# Patient Record
Sex: MALE | Race: WHITE | NOT HISPANIC OR LATINO | Employment: OTHER | URBAN - METROPOLITAN AREA
[De-identification: names, ages, dates, MRNs, and addresses within clinical notes are randomized per-mention and may not be internally consistent; named-entity substitution may affect disease eponyms.]

---

## 2020-03-24 PROBLEM — K21.9 GERD WITH STRICTURE: Status: ACTIVE | Noted: 2020-03-24

## 2020-03-24 PROBLEM — K22.2 GERD WITH STRICTURE: Status: ACTIVE | Noted: 2020-03-24

## 2020-04-13 ENCOUNTER — TELEPHONE (OUTPATIENT)
Dept: SURGERY | Facility: CLINIC | Age: 60
End: 2020-04-13

## 2020-04-13 DIAGNOSIS — C15.9 MALIGNANT NEOPLASM OF ESOPHAGUS, UNSPECIFIED LOCATION: Primary | ICD-10-CM

## 2020-04-14 ENCOUNTER — TELEPHONE (OUTPATIENT)
Dept: HEMATOLOGY/ONCOLOGY | Facility: CLINIC | Age: 60
End: 2020-04-14

## 2020-04-14 NOTE — TELEPHONE ENCOUNTER
Called to schedule appointment.  Patient does not have [employer] [facility], but wife will sign patient up with felicita.  I will call them back tomorrow to verify that he is signed up and to schedule appt.  Wife verbalized understanding.

## 2020-04-17 ENCOUNTER — TELEPHONE (OUTPATIENT)
Dept: INFUSION THERAPY | Facility: HOSPITAL | Age: 60
End: 2020-04-17

## 2020-04-17 PROBLEM — R16.0 HEPATOMEGALY: Status: ACTIVE | Noted: 2020-04-17

## 2020-04-21 PROBLEM — C15.5 MALIGNANT NEOPLASM OF LOWER THIRD OF ESOPHAGUS: Status: ACTIVE | Noted: 2020-04-21

## 2020-04-22 ENCOUNTER — TELEPHONE (OUTPATIENT)
Dept: HEMATOLOGY/ONCOLOGY | Facility: CLINIC | Age: 60
End: 2020-04-22

## 2020-04-22 NOTE — TELEPHONE ENCOUNTER
----- Message from Reza JAMES Shannon sent at 4/22/2020 10:19 AM CDT -----  Contact: Wife/Mckayla  Type:  Test Results    Who Called:  Wife/Mckayla  Name of Test (Lab/Mammo/Etc):  Liver Biopsy  Date of Test:  4/17/2020  Ordering Provider:  Jyoti (Mckayla stated that Dr. Montes's office told her since Dr. Montes was not available this week to call Dr. Valerio & she could give patient results  Where the test was performed:  [facility] Call Back Number:  [phone]  Additional Information:  n/a

## 2020-04-24 ENCOUNTER — TELEPHONE (OUTPATIENT)
Dept: INFUSION THERAPY | Facility: HOSPITAL | Age: 60
End: 2020-04-24

## 2020-04-27 ENCOUNTER — OFFICE VISIT (OUTPATIENT)
Dept: HEMATOLOGY/ONCOLOGY | Facility: CLINIC | Age: 60
End: 2020-04-27
Payer: MEDICAID

## 2020-04-27 VITALS
HEART RATE: 56 BPM | WEIGHT: 173.06 LBS | DIASTOLIC BLOOD PRESSURE: 73 MMHG | OXYGEN SATURATION: 98 % | SYSTOLIC BLOOD PRESSURE: 126 MMHG | BODY MASS INDEX: 32.7 KG/M2 | RESPIRATION RATE: 20 BRPM | TEMPERATURE: 99 F

## 2020-04-27 DIAGNOSIS — R13.10 DYSPHAGIA, UNSPECIFIED TYPE: ICD-10-CM

## 2020-04-27 DIAGNOSIS — C78.7 METASTASIS TO LIVER: ICD-10-CM

## 2020-04-27 DIAGNOSIS — C77.9 MALIGNANT NEOPLASM METASTATIC TO LYMPH NODES, UNSPECIFIED LYMPH NODE REGION: ICD-10-CM

## 2020-04-27 DIAGNOSIS — R63.4 WEIGHT LOSS, ABNORMAL: ICD-10-CM

## 2020-04-27 DIAGNOSIS — C15.9 MALIGNANT NEOPLASM OF ESOPHAGUS, UNSPECIFIED LOCATION: Primary | ICD-10-CM

## 2020-04-27 PROCEDURE — 99205 PR OFFICE/OUTPT VISIT, NEW, LEVL V, 60-74 MIN: ICD-10-PCS | Mod: S$PBB,,, | Performed by: INTERNAL MEDICINE

## 2020-04-27 PROCEDURE — 99999 PR PBB SHADOW E&M-EST. PATIENT-LVL III: ICD-10-PCS | Mod: PBBFAC,,, | Performed by: INTERNAL MEDICINE

## 2020-04-27 PROCEDURE — 99205 OFFICE O/P NEW HI 60 MIN: CPT | Mod: S$PBB,,, | Performed by: INTERNAL MEDICINE

## 2020-04-27 PROCEDURE — 99213 OFFICE O/P EST LOW 20 MIN: CPT | Mod: PBBFAC,PN | Performed by: INTERNAL MEDICINE

## 2020-04-27 PROCEDURE — 99999 PR PBB SHADOW E&M-EST. PATIENT-LVL III: CPT | Mod: PBBFAC,,, | Performed by: INTERNAL MEDICINE

## 2020-04-27 NOTE — Clinical Note
This is a new patient needing palliative chemo esophageal cancer awaiting financial assistance. I have placed the plan, let me know up dates on his financial situation. He will have palliative radiation first and would start chemo in 4-5 weeks, need mediport first. thanks

## 2020-04-27 NOTE — PROGRESS NOTES
"  INITIAL CONSULTATION     DATE: 04/27/2020  Patient Name: Sterling Toro  Referred by: Dr. Whaley  Reason for referral: esophageal cancer      Subjective:       Patient ID: Sterling Toro is a 60 y.o. male.    HPI    Oncology history for review prior to visit:     Malignant neoplasm of lower third of esophagus    4/21/2020 Initial Diagnosis     Malignant neoplasm of lower third of esophagus    12/2019 - reports start of dysphagia  3/24/2020 - PCP visit [facility] dysphagia, refer to GI  03/27/2020-GI visit  04/03/2020-Dr. Whaley - EGD-fungating mass distal esophagus, partial obstructing, easily bleeding, circumferential.  Mass located between 35-38.5 cm from incisors, location of GE junction.   Pathology:  Esophageal mass biopsy:  Invasive moderately differentiated adenocarcinoma arising in the background of Burris's esophagus.  HER2 fish is positive.  PDL1 is positive, score 50.  IHC testing for MMR proteins are normal, intact nuclear expression.  04/08/2020 orders placed for radiation oncology and surgery referrals, EUS.  4/10/20 - CT scans - a/p shows new right liver lobe lesion.  Also noted 3.9 x 4 x 3.7 cm region of masslike thickening in distal esophagus.  Scattered mild prominent mediastinal and hilar lymph nodes, largest 1.6 x 1.5 cm.  Also 2.1 x 2.2 cm necrotic gastrohepatic lymph node and 12 mm periportal lymph node.  EUS postponed, liver biopsy ordered  4/17/20 - liver core needle biopsy:  Pathology:  Carcinoma present, hepatocellular versus metastatic adenocarcinoma, favor the latter.  04/27/2020-medical oncology consult         Patient confirms history above and reports:     He reports aspiration symptoms that started approximately summer 2019. Started PPI. This helped then worsened again. Then dysphagia progressive problem.   Weight loss - 30lbs over last four months.   Appetite is good, he feels he "just cant swallow".   He has vomited from eating too fast per his report. He reports he'll cough it up " and then go try to eat again.    He reports some pain mild abdominal. Doesn't need any meds for this.     He denies past medical surgical history. Kidney stone in past and that is all he recalls. This has happened twice, first in 2000, he has never needed lithotripsy or procedure. Gall bladder stone identified. He reports possibly mild ab pain.    He reports bowels wax and wane, constipation occasional.     No chronic pain.   He had colonoscopy done 2019 that was negative.     Family history grandfather prostate ca. No GI malig.      Construction - very active. Has been working last two weeks and thinks maybe more weight loss only because becoming active again. Heavy lifting, 9-10 hours. In the last year he has noticed less stamina, moves slight slower. Works in [employer] so drives 3 hours per day total in addition to 9 hr workday.     Social:  20 pack year history cigarettes and quit approximately in 1998.  3-4 alcoholic beverages per week.  One beer per night but stopped last 3 months. He is  with 2 children and 3 step children, he  his current wife in 2006.  She developed breast cancer and underwent treatment the year after their marriage.  He has grandchildren as well.      Past Medical History:   Diagnosis Date    Kidney stone      No past surgical history on file.  Social History     Socioeconomic History    Marital status:      Spouse name: Not on file    Number of children: Not on file    Years of education: Not on file    Highest education level: Not on file   Occupational History    Not on file   Social Needs    Financial resource strain: Not on file    Food insecurity:     Worry: Not on file     Inability: Not on file    Transportation needs:     Medical: Not on file     Non-medical: Not on file   Tobacco Use    Smoking status: Former Smoker    Smokeless tobacco: Never Used   Substance and Sexual Activity    Alcohol use: Yes     Comment: occasional    Drug use: Not on file     Sexual activity: Not on file   Lifestyle    Physical activity:     Days per week: Not on file     Minutes per session: Not on file    Stress: Not on file   Relationships    Social connections:     Talks on phone: Not on file     Gets together: Not on file     Attends Anabaptist service: Not on file     Active member of club or organization: Not on file     Attends meetings of clubs or organizations: Not on file     Relationship status: Not on file   Other Topics Concern    Not on file   Social History Narrative    Not on file     Family History   Problem Relation Age of Onset    Hypertension Mother     Emphysema Father        Current Outpatient Medications   Medication Sig Dispense Refill    pantoprazole (PROTONIX) 40 MG tablet Take 1 tablet (40 mg total) by mouth once daily. 30 tablet 6     No current facility-administered medications for this visit.      Facility-Administered Medications Ordered in Other Visits   Medication Dose Route Frequency Provider Last Rate Last Dose    barium sulfate (READI-CAT) suspension 900 mL  900 mL Oral Once Benoit Whaley MD         ALLERGIES REVIEWED    Review of Systems    Constitutional:  Positive for activity change, appetite change, positive fatigue, fever and unexpected weight change.   HENT: Negative for mouth sores and sore throat.    Respiratory: Negative for cough and shortness of breath.    Cardiovascular: Negative for chest pain, palpitations and leg swelling.   Gastrointestinal: Negative for abdominal pain, constipation and diarrhea.   Genitourinary: Negative for dysuria and frequency.   Musculoskeletal: Negative for back pain and joint swelling.   Neurological: Negative for weakness, light-headedness and numbness.   Hematological: Does not bruise/bleed easily.   Skin: no rash, no lesions, no itching    Objective:      Weight loss noted from 2019 to current  /73 (BP Location: Right arm, Patient Position: Sitting, BP Method: Medium (Automatic))    Pulse (!) 56   Temp 98.5 °F (36.9 °C) (Oral)   Resp 20   Wt 78.5 kg (173 lb 1 oz)   SpO2 98%   BMI 32.70 kg/m²     Wt Readings from Last 25 Encounters:   04/27/20 78.5 kg (173 lb 1 oz)   04/17/20 77.1 kg (170 lb)   09/13/19 95.2 kg (209 lb 14.4 oz)   01/10/19 97.9 kg (215 lb 12.8 oz)   09/29/18 94.1 kg (207 lb 7.3 oz)   01/26/18 93.2 kg (205 lb 6.4 oz)       Physical Exam    Constitutional: He is oriented to person, place, and time. No distress.  he is not on healthy-appearing  HENT: Mouth/Throat: Oropharynx is clear and moist. No oropharyngeal exudate.   Eyes: Conjunctivae and EOM are normal.   Neck: Normal range of motion. Neck supple.   Cardiovascular: Normal rate, regular rhythm, normal heart sounds and intact distal pulses.    Pulmonary/Chest: Effort normal and breath sounds normal. he has no wheezes.   He has no rales.    Abdominal: Soft. Bowel sounds are normal. he exhibits no distension. There is no tenderness.   Musculoskeletal: he exhibits no edema or tenderness.   Lymphadenopathy:     he has no cervical adenopathy. no supraclavicular adenopathy. No axillary LAD.   Neurological: he is alert and oriented to person, place, and time. he has normal strength. No cranial nerve deficit or sensory deficit.   Skin: Skin is warm and dry. No rash noted. No erythema.   Psychiatric: he has a normal mood and affect. speech is normal.   Nursing note and vitals reviewed.        Assessment:       1. Malignant neoplasm of esophagus, unspecified location    2. 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 Metastasis to liver    4. Weight loss, abnormal    5. Dysphagia, unspecified type      Cancer Staging  Malignant neoplasm of lower third of esophagus  Staging form: Esophagus - Adenocarcinoma, AJCC 8th Edition  - Clinical stage from 4/27/2020: Stage IVB (cNX, pM1) - Unsigned      ECOG SCORE         ECOG 1-2    Patient is a  60 y.o. male here with new diagnosis adenocarcinoma of the  distal esophageal cancer with biopsy-proven single liver metastasis.  HER2 positive PDL1 positive, which is good news in regards to additional treatment targets.    Discussed briefly with Dr. Lino Zaman, confirm there is no indication to consider curative intent plan.  Discussed with Dr. Eldridge. Radiation Oncology as well and possible palliative radiation prior to systemic treatment if systemic treatment is done.      For systemic therapy, first-line standard treatment for HER2 positive adenocarcinoma of the esophagus or GE junction would be chemotherapy doublet plus trastuzumab, such as FOLFOX trastuzumab.  (Expect better tolerance than alternative regimen cisplatin 5 FU with trastuzumab).  Expected high toxicity with triplet regimen and would not recommend this.   Also discussed later line immunotherapy since he is PDL1 positive so this is good news.    Discussed incurable but very treatable to improve quality of life and prolong life, he and wife expressed good understanding of this    Discussed diagnosis, work-up, staging and treatment recommendations in great detail with patient and wife.  Emotional support given.    He would like to proceed with palliative radiation followed by systemic chemotherapy, and does plan to obtain a 2nd opinion which is encouraged.  He does not currently have insurance coverage for chemotherapy and plans to pursue this, he will meet with financial aid.     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    MD visit here 3 weeks to re-evaluate  and review plan for chemotherapy going forward  If he is able to obtain financial assistance for chemotherapy will try to start this process sooner.    Exercise/nutrition  Important to keep follow-up with PCP and radiation oncology.    Discussed plan above in great detail with patient and wife and all questions answered to their satisfaction. Proceed with plan above.       Thank you for the referral. Please call with any questions.           Rama Valerio M.D.  Hematology Oncology  [facility]  [place]

## 2020-04-28 DIAGNOSIS — C78.7 METASTASIS TO LIVER: ICD-10-CM

## 2020-04-28 DIAGNOSIS — C15.9 MALIGNANT NEOPLASM OF ESOPHAGUS, UNSPECIFIED LOCATION: Primary | ICD-10-CM

## 2020-04-28 DIAGNOSIS — C77.9 MALIGNANT NEOPLASM METASTATIC TO LYMPH NODES, UNSPECIFIED LYMPH NODE REGION: ICD-10-CM

## 2020-04-30 ENCOUNTER — TELEPHONE (OUTPATIENT)
Dept: HEMATOLOGY/ONCOLOGY | Facility: CLINIC | Age: 60
End: 2020-04-30

## 2020-04-30 NOTE — TELEPHONE ENCOUNTER
Rtnd call to Mckayla.  Wants to ask questions re: pt's life expectancy if he does only radiation therapy.  Also questions re: goals of treatment and what effects he will have if he starts treatment.  Explained this message routed to Dr. Valerio for guidance.  Verb appreciation for the call.

## 2020-04-30 NOTE — TELEPHONE ENCOUNTER
----- Message from Marta Salcido sent at 4/30/2020  8:41 AM CDT -----  Contact: Wife  Type: Needs Medical Advice    Who Called:  Mckayla    Best Call Back Number: [phone]    Additional Information: Wife requesting a call back from Dr. Valerio. Says very important she speaks with her. No other info given

## 2020-04-30 NOTE — TELEPHONE ENCOUNTER
Returned call to wife Mckayla and reiterated what we discussed in initial consult, goals of treatment palliative to prolong life and maintain QOL, life expectancy, goals of radiation with and without chemotherapy after, she expressed understanding and questions answered to her satisfaction.    FRANK

## 2020-05-04 ENCOUNTER — TELEPHONE (OUTPATIENT)
Dept: HEMATOLOGY/ONCOLOGY | Facility: CLINIC | Age: 60
End: 2020-05-04

## 2020-05-04 NOTE — TELEPHONE ENCOUNTER
Wife will have patient return form for Herceptin financial assistance tomorrow.  Requested [employer] in financial services to contact wife to discuss any other options for assistance with chemotherapy treatment.  Wife verbalized understanding.    ----- Message from Hanna Chapa sent at 5/4/2020 10:01 AM CDT -----  Contact: Pt wife  Type: Needs medical advice      Who Called: Pt wife  Best Call Back Number:   [phone]  Additional Information:   Pt wife is requesting a call back in regards to setting up pt chemo and meds for  treatment.       Please advise. Thank you

## 2020-05-22 ENCOUNTER — TELEPHONE (OUTPATIENT)
Dept: HEMATOLOGY/ONCOLOGY | Facility: CLINIC | Age: 60
End: 2020-05-22

## 2020-05-22 NOTE — TELEPHONE ENCOUNTER
Spoke with wife.  She has questions regarding the cost of the chemo if the Herceptin is paid by the te.  Informed her that a message would be sent to [employer] to contact her to discuss financial arrangements.  Also, she inquired about how long patient would have to live if he did not take chemo, she stated that she had be told 6-8 months, but she has questions. Informed her that Dr. Valerio is out until 6/1, but an appointment would need to be made for her to discuss options,etc.  Wife will speak with  regarding appointment and let us know.  Wife was provided phone number to call to schedule.  Wife verbalized understanding.    ----- Message from Elsie Rene sent at 5/22/2020 10:32 AM CDT -----   Type: Needs Medical Advice  Who Called:  Pt  Anatoliy wright  Best Call Back Number: [phone]  Additional Information: ##  Calling to  Discuss  chemo

## 2020-05-28 ENCOUNTER — TELEPHONE (OUTPATIENT)
Dept: SURGICAL ONCOLOGY | Facility: CLINIC | Age: 60
End: 2020-05-28

## 2020-05-29 ENCOUNTER — TELEPHONE (OUTPATIENT)
Dept: HEMATOLOGY/ONCOLOGY | Facility: CLINIC | Age: 60
End: 2020-05-29

## 2020-05-29 NOTE — TELEPHONE ENCOUNTER
----- Message from Merly Resendiz sent at 5/29/2020  8:41 AM CDT -----  Contact: Barb Day is calling in a consult the doctor is Dr Tadeo.  Call Barb back at [phone] .  I tried to reach everyone as directed as was asked to send message they are in a meeting. Thank you

## 2020-05-30 PROBLEM — K22.89 ESOPHAGEAL MASS: Status: ACTIVE | Noted: 2020-05-30

## 2020-06-01 ENCOUNTER — TELEPHONE (OUTPATIENT)
Dept: HEMATOLOGY/ONCOLOGY | Facility: CLINIC | Age: 60
End: 2020-06-01

## 2020-06-01 ENCOUNTER — INITIAL CONSULT (OUTPATIENT)
Dept: SURGICAL ONCOLOGY | Facility: CLINIC | Age: 60
End: 2020-06-01
Payer: MEDICAID

## 2020-06-01 VITALS
TEMPERATURE: 98 F | BODY MASS INDEX: 25.83 KG/M2 | WEIGHT: 155 LBS | DIASTOLIC BLOOD PRESSURE: 78 MMHG | RESPIRATION RATE: 20 BRPM | HEIGHT: 65 IN | SYSTOLIC BLOOD PRESSURE: 104 MMHG | HEART RATE: 94 BPM

## 2020-06-01 DIAGNOSIS — C15.5 MALIGNANT NEOPLASM OF LOWER THIRD OF ESOPHAGUS: Primary | ICD-10-CM

## 2020-06-01 DIAGNOSIS — C78.7 METASTASIS TO LIVER: ICD-10-CM

## 2020-06-01 PROCEDURE — 99213 OFFICE O/P EST LOW 20 MIN: CPT | Mod: PBBFAC,PN | Performed by: SURGERY

## 2020-06-01 PROCEDURE — 99204 PR OFFICE/OUTPT VISIT, NEW, LEVL IV, 45-59 MIN: ICD-10-PCS | Mod: S$PBB,,, | Performed by: SURGERY

## 2020-06-01 PROCEDURE — 99204 OFFICE O/P NEW MOD 45 MIN: CPT | Mod: S$PBB,,, | Performed by: SURGERY

## 2020-06-01 PROCEDURE — 99999 PR PBB SHADOW E&M-EST. PATIENT-LVL III: CPT | Mod: PBBFAC,,, | Performed by: SURGERY

## 2020-06-01 PROCEDURE — 99999 PR PBB SHADOW E&M-EST. PATIENT-LVL III: ICD-10-PCS | Mod: PBBFAC,,, | Performed by: SURGERY

## 2020-06-01 NOTE — LETTER
June 1, 2020        Benoit Whaley MD  [address]   [facility], [facility] [address]             [facility]  [address]  [facility] [address]  Phone: [phone]  Fax: [phone]   Patient: Sterling Toro   MR Number: [id number]   YOB: 1960   Date of Visit: 6/1/2020       Dear Dr. Whaley:    Thank you for referring Sterling Toro to me for evaluation. Attached you will find relevant portions of my assessment and plan of care.    If you have questions, please do not hesitate to call me. I look forward to following Sterling Toro along with you.    Sincerely,      Lino Zaman MD            CC  No Recipients    Enclosure

## 2020-06-01 NOTE — PROGRESS NOTES
"61 yo man referred by Dr Whaley who first began to notice difficulty swallowing late last year, and has had progressively worsening dysphagia since then. EGD done by Dr Whaley in early April revealed an obstructing mass in the distal esophagus, and biopsy revealed KATE which was Her-2 + and also PDL1 +. CT scan has shown several liver mets, histologically confirmed by IR-biopsy, and possibly several lung metastases as well. The patient has seen Dr Valerio and FOLFOX/Herceptin has been recommended but it is not clear to me whether this has been started yet. He has completed a planned three week course of XRT and tells me at the completion of this his swallowing was even worse and he required hospital admit a few days ago for IV fluids because he was unable to take anything by mouth. In hospital, an EGD with dilation was done which has helped his swallowing "a little" and also a PEG was placed for ongoing nutrition, and he has begun to use this. He is scheduled by Dr Carpenter for esophageal stent placement on Rashmi 3.   Mr MONTIEL confirms that systemic therapy has not yet been started because he is not sure he wants to take it. His wife had chemotherapy for breast cancer more than 10 years ago and, although she is a cancer survivor, she has had ongoing debility from the chemo. He is meeting back with Dr Valerio today to discuss this further.   He also wonders if 'just the tumor" can be cut out if the stent does not work.       Past Medical History:   Diagnosis Date    Cancer     esophageal     Kidney stone      Medication List with Changes/Refills   Current Medications    LACTOSE-REDUCED FOOD WITH FIBR (JEVITY 1.5 ALEJO) 0.06 GRAM-1.5 KCAL/ML LIQD    Take 240 mLs by mouth 5 (five) times daily. Jevity 1.5 tube feeding via PEG. 5 cans per day with 240 ML water flush q 6 hours to provide: 1800 kcal, 77 grams protein (>90% of ENN) and 1872 mL free water.    ONDANSETRON (ZOFRAN) 8 MG TABLET    Take 8 mg by mouth every 8 (eight) " "hours as needed for Nausea.     PANTOPRAZOLE (PROTONIX) 40 MG TABLET    Take 1 tablet (40 mg total) by mouth once daily.    PAPAYA ENZYME ORAL    Take 1 Dose by mouth once daily.     Review of patient's allergies indicates:  No Known Allergies     Review of Systems   Constitutional: Negative.         He tells me that he would feel "pretty well" if only he could swallow and eat.    HENT: Negative.    Eyes: Negative.    Respiratory: Negative.    Cardiovascular: Negative.    Gastrointestinal:        Almost complete aphagia   Genitourinary: Negative.    Musculoskeletal: Negative.    Skin: Negative.    Neurological: Negative.    Endo/Heme/Allergies: Negative.    Psychiatric/Behavioral: Negative.      Vitals:    06/01/20 1303   BP: 104/78   Pulse: 94   Resp: 20   Temp: 97.5 °F (36.4 °C)     WD WN man in NAD  No SCV adenopathy  Chest clear to ausc  Heart: RR&R with no m, r, g  Abd: soft; PEG tube in place  Ext: neg  Neuro: wnl  Psych: appears mildly depressed    Imp:  Stage 4 esophageal adenocarcinoma    Rec:  Surgical resection not indicated and I explained the reasons for this to Mr MONTIEL  I have encouraged him to learn more about the systemic treatment options and emphasized to him that the fact that his tumor is Her-2 + and has a high PDL1 score opens up several additonal options for therapy, leading us to believe that there is a good chance he will have a meaningful response.     Multiple questions answered.       "

## 2020-06-01 NOTE — LETTER
June 1, 2020      Benoit Whaley MD  [address]   [facility], [facility] [address]           [facility]  [address]  [place] [address]  Phone: [phone]  Fax: [phone]          Patient: Sterling Toro   MR Number: [id number]   YOB: 1960   Date of Visit: 6/1/2020       Dear Dr. Benoit Whaley:    Thank you for referring Sterling Toro to me for evaluation. Attached you will find relevant portions of my assessment and plan of care.    If you have questions, please do not hesitate to call me. I look forward to following Sterling Toro along with you.    Sincerely,    Lino Zaman MD    Enclosure  CC:  No Recipients    If you would like to receive this communication electronically, please contact [email or web address] or [phone] to request more information on [employer] Link access.    For providers and/or their staff who would like to refer a patient to [facility], please contact us through our one-stop-shop provider referral line, [facility], at [phone].    If you feel you have received this communication in error or would no longer like to receive these types of communications, please e-mail [email or web address]

## 2020-06-01 NOTE — TELEPHONE ENCOUNTER
----- Message from Adrienne Babcock sent at 6/1/2020  9:13 AM CDT -----  Type: Needs Medical Advice  Who Called:  Abel valeria Reinoso    Best Call Back Number: [phone]  Additional Information: home health start of care delayed 06/01/2020 due to patient has MD appointment start of care will be 06/02/2020

## 2020-06-02 NOTE — TELEPHONE ENCOUNTER
Spoke to patient wife tried to get him for appointment but he is having a stent in his esophagous this week and then next week they are going out of town and want be back until the 17th.

## 2020-06-03 ENCOUNTER — TELEPHONE (OUTPATIENT)
Dept: HEMATOLOGY/ONCOLOGY | Facility: CLINIC | Age: 60
End: 2020-06-03

## 2020-06-03 NOTE — TELEPHONE ENCOUNTER
----- Message from Deneen Garvin MA sent at 6/3/2020  3:21 PM CDT -----  Contact: [phone]  Home Health Pt will not be admitted to [facility] home health Pt is not home bound and is going out of town

## 2020-06-18 ENCOUNTER — TELEPHONE (OUTPATIENT)
Dept: HEMATOLOGY/ONCOLOGY | Facility: CLINIC | Age: 60
End: 2020-06-18

## 2020-06-18 ENCOUNTER — OFFICE VISIT (OUTPATIENT)
Dept: HEMATOLOGY/ONCOLOGY | Facility: CLINIC | Age: 60
End: 2020-06-18
Payer: MEDICAID

## 2020-06-18 ENCOUNTER — NURSE TRIAGE (OUTPATIENT)
Dept: ADMINISTRATIVE | Facility: CLINIC | Age: 60
End: 2020-06-18

## 2020-06-18 VITALS
HEIGHT: 65 IN | DIASTOLIC BLOOD PRESSURE: 77 MMHG | BODY MASS INDEX: 25.85 KG/M2 | TEMPERATURE: 98 F | WEIGHT: 155.19 LBS | RESPIRATION RATE: 14 BRPM | SYSTOLIC BLOOD PRESSURE: 108 MMHG | HEART RATE: 85 BPM | OXYGEN SATURATION: 97 %

## 2020-06-18 DIAGNOSIS — C15.9 MALIGNANT NEOPLASM OF ESOPHAGUS, UNSPECIFIED LOCATION: Primary | ICD-10-CM

## 2020-06-18 DIAGNOSIS — R13.10 DYSPHAGIA, UNSPECIFIED TYPE: ICD-10-CM

## 2020-06-18 DIAGNOSIS — C77.9 MALIGNANT NEOPLASM METASTATIC TO LYMPH NODES, UNSPECIFIED LYMPH NODE REGION: ICD-10-CM

## 2020-06-18 DIAGNOSIS — R63.4 WEIGHT LOSS, ABNORMAL: ICD-10-CM

## 2020-06-18 DIAGNOSIS — R45.89 ANXIETY ABOUT HEALTH: ICD-10-CM

## 2020-06-18 DIAGNOSIS — C78.7 METASTASIS TO LIVER: ICD-10-CM

## 2020-06-18 PROCEDURE — 99214 OFFICE O/P EST MOD 30 MIN: CPT | Mod: PBBFAC,PN | Performed by: INTERNAL MEDICINE

## 2020-06-18 PROCEDURE — 99215 PR OFFICE/OUTPT VISIT, EST, LEVL V, 40-54 MIN: ICD-10-PCS | Mod: S$PBB,,, | Performed by: INTERNAL MEDICINE

## 2020-06-18 PROCEDURE — 99999 PR PBB SHADOW E&M-EST. PATIENT-LVL IV: CPT | Mod: PBBFAC,,, | Performed by: INTERNAL MEDICINE

## 2020-06-18 PROCEDURE — 99215 OFFICE O/P EST HI 40 MIN: CPT | Mod: S$PBB,,, | Performed by: INTERNAL MEDICINE

## 2020-06-18 PROCEDURE — 99999 PR PBB SHADOW E&M-EST. PATIENT-LVL IV: ICD-10-PCS | Mod: PBBFAC,,, | Performed by: INTERNAL MEDICINE

## 2020-06-18 NOTE — TELEPHONE ENCOUNTER
Spoke with wife, they are currently in route to the [facility].  She was instructed that both will need to have mask and use hand santizers.  Wife verbalized understanding.

## 2020-06-18 NOTE — TELEPHONE ENCOUNTER
Pt's wife states pt has appointment today at 9. Pt had exposure to COVID 19 approx 8 days ago. Pt denies any new symptoms of COVID 19. Pt advised per protocol, pt verbalizes understanding, and message sent to MD staff.     Reason for Disposition   Nursing judgment    Protocols used: NO PROTOCOL AVAILABLE - INFORMATION ONLY-A-[place]

## 2020-06-18 NOTE — PROGRESS NOTES
"FOLLOW-UP VISIT    Patient name: Sterling Toro  Date: 6/18/20      Subjective:       Patient ID: Sterling Toro is a 60 y.o. male.    Chief Complaint: Plan Of Care (Options?)    HPI  Since last visit:      5/2020 - 3 weeks palliative radiation.   PEG tube placed after radiation complete  6/1/20 - Dr. Zaman initial consult -   6/3/20 - palliative stent placed.  6/4/20 - ED vis chest pain. Case discussed with GI who recommended CTA of the chest to consider esophageal rupture or PE. CT showed no PE and no esophageal rupture.     [place] and saw a doctor there and had a detox, he received a "LED system treatment" and detox. This was last week.     BM are good, no blood in stool.   Still some chest pain since stent, not needing pain meds.     His dysphagia improved greatly after esophageal stent. Since then drinking, some eating, some PEG tube use for    Daily schedule - working on his house, planning to sell his house, will move to [place]. This is where all family is. Hoping for September or august. He is able to do the work on house but stops frequently, feels tired after radiation. SOB with exertion. This resolves quick.       See detailed oncology history below, updated with most recent scans, labs, pertinent medical history.   Oncology History   Malignant neoplasm of lower third of esophagus   4/21/2020 Initial Diagnosis    Malignant neoplasm of lower third of esophagus    12/2019 - reports start of dysphagia  3/24/2020 - PCP visit CC dysphagia, refer to GI  03/27/2020-GI visit  04/03/2020-Dr. Whaley - EGD-fungating mass distal esophagus, partial obstructing, easily bleeding, circumferential.  Mass located between 35-38.5 cm from incisors, location of GE junction.   Pathology:  Esophageal mass biopsy:  Invasive moderately differentiated adenocarcinoma arising in the background of Burris's esophagus.  HER2 fish is positive.  PDL1 is positive, score 50.  IHC testing for " MMR proteins are normal, intact nuclear expression.  04/08/2020 orders placed for radiation oncology and surgery referrals, EUS.  4/10/20 - CT scans - a/p shows new right liver lobe lesion.  Also noted 3.9 x 4 x 3.7 cm region of masslike thickening in distal esophagus.  Scattered mild prominent mediastinal and hilar lymph nodes, largest 1.6 x 1.5 cm.  Also 2.1 x 2.2 cm necrotic gastrohepatic lymph node and 12 mm periportal lymph node.  EUS postponed, liver biopsy ordered  4/17/20 - liver core needle biopsy:  Pathology:  Carcinoma present, hepatocellular versus metastatic adenocarcinoma, favor the latter.  04/27/2020-medical oncology consult       5/25/2020 -  Chemotherapy    Treatment Summary   Plan Name: OP FOLFOX 6 Q2W  Treatment Goal: Supportive  Status: Active  Start Date: 5/25/2020 (Planned)  End Date: 10/28/2020 (Planned)  Provider: Rama Valerio MD  Chemotherapy: fluorouraciL injection, 400 mg/m2, Intravenous, Clinic/HOD 1 time, 0 of 12 cycles  fluorouracil (ADRUCIL) in sodium chloride 0.9% 100 mL chemo infusion, 2,400 mg/m2, Intravenous, Over 46 hours, 0 of 12 cycles  leucovorin calcium in dextrose 5 % 250 mL infusion, 400 mg/m2, Intravenous, Clinic/HOD 1 time, 0 of 12 cycles  oxaliplatin (ELOXATIN) in dextrose 5 % 500 mL chemo infusion, 85 mg/m2, Intravenous, Clinic/HOD 1 time, 0 of 12 cycles  trastuzumab in sodium chloride 0.9% chemo infusion, 4 mg/kg = 314 mg (original dose ), Intravenous, Clinic/HOD 1 time, 0 of 12 cycles  Dose modification: 4 mg/kg (Cycle 1)         I have reviewed my initial consult note with detailed PMH/ROS from initial encounter and all following progress notes.   Past medical, surgical, family, and social history were reviewed today and there are no changes of note unless mentioned in HPI.     MEDS and ALLERGIES were reviewed with patient and meds reconciled.     Fatigue  Not severe    Weight/appetite  stable   Constitutional  Denies F/C    Pain  Denies    Sleep  Ok   "  Mucositis  Denies    Cardiovascular  Denies CP except occasional he realtes to stent, not severe   Respiratory  Denies SOB    Nausea  Denies    BMs  Normal    GI  Denies abdominal pain    Bleeding  Denies epistaxis, hemoptysis, BRBPR, melena, hematuria or any other bleeding    Extremities  Denies edema    Skin/nail/hair  Denies toxicity    Neuropathy  Denies    Psych  In good spirits    Misc  n/a              Objective:      /77 (BP Location: Left arm, Patient Position: Sitting)   Pulse 85   Temp 97.6 °F (36.4 °C) (Temporal)   Resp 14   Ht 5' 5" (1.651 m)   Wt 70.4 kg (155 lb 3.3 oz)   SpO2 97%   BMI 25.83 kg/m²   Wt Readings from Last 30 Encounters:   06/18/20 70.4 kg (155 lb 3.3 oz)   06/04/20 69.9 kg (154 lb)   06/03/20 70.6 kg (155 lb 10.3 oz)   06/01/20 70.3 kg (154 lb 15.7 oz)   05/30/20 69.9 kg (154 lb 1.6 oz)   04/27/20 78.5 kg (173 lb 1 oz)   04/17/20 77.1 kg (170 lb)   09/13/19 95.2 kg (209 lb 14.4 oz)   01/10/19 97.9 kg (215 lb 12.8 oz)   09/29/18 94.1 kg (207 lb 7.3 oz)   01/26/18 93.2 kg (205 lb 6.4 oz)       Constitutional: He is oriented to person, place, and time. No distress. he is not unwell appearing. Some visible weight loss  HENT: Mouth/Throat: Oropharynx is clear and moist. No oropharyngeal exudate.   Eyes: Conjunctivae and EOM are normal.   Neck: Normal range of motion. Neck supple.   Cardiovascular: Normal rate, regular rhythm, normal heart sounds and intact distal pulses.    Pulmonary/Chest: Effort normal and breath sounds normal. he has no wheezes.   He has no rales.    Abdominal: Soft. Bowel sounds are normal. he exhibits no distension. There is no tenderness. PEG tube noted, no erythema at insertion site.  Musculoskeletal: he exhibits no edema or tenderness.   Lymphadenopathy:     he has no cervical adenopathy. no supraclavicular adenopathy. No axillary LAD.   Neurological: he is alert and oriented to person, place, and time. he has normal strength. No cranial nerve deficit " or sensory deficit.   Skin: Skin is warm and dry. No rash noted. No erythema.   Psychiatric: he has a normal mood and affect. speech is normal.   Nursing note and vitals reviewed.        CBC, CMP reviewed, noted are CMP from 6/4/20 ED vis is very good. CBC very good as well  Assessment:       1. Malignant neoplasm of esophagus, unspecified location    2. 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 Metastasis to liver    4. Dysphagia, unspecified type    5. Weight loss, abnormal      Cancer Staging  Malignant neoplasm of lower third of esophagus  Staging form: Esophagus - Adenocarcinoma, AJCC 8th Edition  - Clinical stage from 4/27/2020: Stage IVB (cNX, pM1) - Unsigned      ECOG 1-2     Patient is a  60 y.o. male here with new diagnosis adenocarcinoma of the distal esophageal cancer with biopsy-proven single liver metastasis.  HER2 positive PDL1 positive, which is good news in regards to additional treatment targets.     Discussed briefly with Dr. Lino Zaman, confirm there is no indication to consider curative intent plan.  Discussed with Dr. Eldridge. Radiation Oncology as well and possible palliative radiation prior to systemic treatment if systemic treatment is done.       For systemic therapy, first-line standard treatment for HER2 positive adenocarcinoma of the esophagus or GE junction would be chemotherapy doublet plus trastuzumab, such as FOLFOX trastuzumab.  (Expect better tolerance than alternative regimen cisplatin 5 FU with trastuzumab).  Expected high toxicity with triplet regimen and would not recommend this.   Also discussed later line immunotherapy since he is PDL1 positive so this is good news.     Discussed incurable but very treatable to improve quality of life and prolong life, he and wife expressed good understanding of this     Discussed diagnosis, work-up, staging and treatment recommendations in great detail with patient and wife.  Emotional support  given.     He would like to proceed with palliative radiation followed by systemic chemotherapy, and does plan to obtain a 2nd opinion which is encouraged.  He does not currently have insurance coverage for chemotherapy and plans to pursue this, he will meet with financial aid.      (Tentative plan: mFOLFOX6 + Trastuzumab 4 mg/kg q14 Days x 8 Cycles Followed By Trastuzumab Maintenance (6 mg/kg q21 Days) Until Progression or Unacceptable Toxicity)    TODAY 6/18/20 returns for followup after some delay, no insurance coverage prior but now has medicaid and remains doubtful about chemotherapy, presents again to discuss systemic treatment.     He continues very reluctant to start treatment. We discussed chemo and immunotherapy and standard of care indications for each in great detail. (chemo first line, IO is standard second line with +PDL1 but could be considered first if not fit for chemo). He demonstrates some denial which is understandable. He is interested in delaying until he moves to be with family in [place], planned for 3 months from now, remains interested in alternative treatments as well (see HPI). We discussed he may miss window for treatment if he declines in functional status and explained rationale and expected progression of this. He showed good understanding.    He would like to decline chemo start at this time and have new scans in a few weeks and then re-eval his decision making. This is an acceptable plan fitting his personal goals and symptomatic esophageal tumor is currently well palliated. we discussed in detail return sooner if any decline, new physical symptoms whatsoever, should be evaluated sooner. Otherwise will obtain new scans approx 3-4 weeks with lab and MD vis and will re-evaluate consideration to start treatment.         Plan:       Sterling was seen today for plan of care.    Diagnoses and all orders for this visit:    Malignant neoplasm of esophagus, unspecified location    Malignant  neoplasm metastatic to lymph nodes, unspecified lymph node region    Metastasis to liver    Dysphagia, unspecified type    Weight loss, abnormal            CT scans in 3-4 weeks with lab CBC, CMP and MD vis 1-2 days after scans.     Exercise/nutrition important, reviewed    Continue current medications, reviewed.      Important to keep follow-up with PCP.     Discussed plan above in great detail with patient and all questions answered to their satisfaction. Proceed with plan above. Greater than 50% of visit today was spent in discussing condition and continued treatment plan, counseling, education, for >40 minutes.            Rama Valerio M.D.  Hematology Oncology  [facility]  [facility]

## 2020-06-18 NOTE — TELEPHONE ENCOUNTER
----- Message from Alecia Mcgrath sent at 6/18/2020  8:20 AM CDT -----  Regarding: exposed to covid 19  Type: Needs Medical Advice  Who Called:  pt  Symptoms (please be specific):      How long has patient had these symptoms:      Pharmacy name and phone #:      Best Call Back Number: [phone] (home)     Additional Information: pt wife called and stated that her neighbor test positive for the covid 19 pt was in contacted with the neighbor possibly exposed pt sent to the triage line for more instruction on what to do. Pls call to advise

## 2020-07-08 ENCOUNTER — HOSPITAL ENCOUNTER (OUTPATIENT)
Dept: RADIOLOGY | Facility: HOSPITAL | Age: 60
Discharge: HOME | End: 2020-07-08
Attending: INTERNAL MEDICINE
Payer: MEDICAID

## 2020-07-08 DIAGNOSIS — C15.9 MALIGNANT NEOPLASM OF ESOPHAGUS, UNSPECIFIED LOCATION: ICD-10-CM

## 2020-07-08 PROCEDURE — 74177 CT ABD & PELVIS W/CONTRAST: CPT | Mod: 26,,, | Performed by: RADIOLOGY

## 2020-07-08 PROCEDURE — 25500020 PHARM REV CODE 255: Mod: PO | Performed by: INTERNAL MEDICINE

## 2020-07-08 PROCEDURE — 71260 CT THORAX DX C+: CPT | Mod: 26,,, | Performed by: RADIOLOGY

## 2020-07-08 PROCEDURE — 71260 CT CHEST ABDOMEN PELVIS WITH CONTRAST (XPD): ICD-10-PCS | Mod: 26,,, | Performed by: RADIOLOGY

## 2020-07-08 PROCEDURE — 74177 CT CHEST ABDOMEN PELVIS WITH CONTRAST (XPD): ICD-10-PCS | Mod: 26,,, | Performed by: RADIOLOGY

## 2020-07-08 PROCEDURE — 74177 CT ABD & PELVIS W/CONTRAST: CPT | Mod: TC,PO

## 2020-07-08 PROCEDURE — A9698 NON-RAD CONTRAST MATERIALNOC: HCPCS | Mod: PO | Performed by: INTERNAL MEDICINE

## 2020-07-08 RX ADMIN — IOHEXOL 75 ML: 350 INJECTION, SOLUTION INTRAVENOUS at 10:07

## 2020-07-08 RX ADMIN — IOHEXOL 1000 ML: 9 SOLUTION ORAL at 10:07

## 2020-07-09 ENCOUNTER — OFFICE VISIT (OUTPATIENT)
Dept: HEMATOLOGY/ONCOLOGY | Facility: CLINIC | Age: 60
End: 2020-07-09
Payer: MEDICAID

## 2020-07-09 VITALS
BODY MASS INDEX: 25.23 KG/M2 | WEIGHT: 151.44 LBS | TEMPERATURE: 98 F | RESPIRATION RATE: 12 BRPM | HEIGHT: 65 IN | DIASTOLIC BLOOD PRESSURE: 68 MMHG | HEART RATE: 79 BPM | SYSTOLIC BLOOD PRESSURE: 110 MMHG | OXYGEN SATURATION: 96 %

## 2020-07-09 DIAGNOSIS — G89.3 CANCER ASSOCIATED PAIN: ICD-10-CM

## 2020-07-09 DIAGNOSIS — C15.9 MALIGNANT NEOPLASM OF ESOPHAGUS, UNSPECIFIED LOCATION: Primary | ICD-10-CM

## 2020-07-09 DIAGNOSIS — R53.0 NEOPLASTIC (MALIGNANT) RELATED FATIGUE: ICD-10-CM

## 2020-07-09 DIAGNOSIS — C77.9 MALIGNANT NEOPLASM METASTATIC TO LYMPH NODES, UNSPECIFIED LYMPH NODE REGION: ICD-10-CM

## 2020-07-09 DIAGNOSIS — R45.89 ANXIETY ABOUT HEALTH: ICD-10-CM

## 2020-07-09 DIAGNOSIS — C78.7 METASTASIS TO LIVER: ICD-10-CM

## 2020-07-09 PROCEDURE — 99215 PR OFFICE/OUTPT VISIT, EST, LEVL V, 40-54 MIN: ICD-10-PCS | Mod: S$PBB,,, | Performed by: INTERNAL MEDICINE

## 2020-07-09 PROCEDURE — 99214 OFFICE O/P EST MOD 30 MIN: CPT | Mod: PBBFAC,PN | Performed by: INTERNAL MEDICINE

## 2020-07-09 PROCEDURE — 99999 PR PBB SHADOW E&M-EST. PATIENT-LVL IV: ICD-10-PCS | Mod: PBBFAC,,, | Performed by: INTERNAL MEDICINE

## 2020-07-09 PROCEDURE — 99999 PR PBB SHADOW E&M-EST. PATIENT-LVL IV: CPT | Mod: PBBFAC,,, | Performed by: INTERNAL MEDICINE

## 2020-07-09 PROCEDURE — 99215 OFFICE O/P EST HI 40 MIN: CPT | Mod: S$PBB,,, | Performed by: INTERNAL MEDICINE

## 2020-07-09 RX ORDER — OXYCODONE AND ACETAMINOPHEN 7.5; 325 MG/1; MG/1
1 TABLET ORAL EVERY 8 HOURS PRN
Qty: 60 TABLET | Refills: 0 | Status: SHIPPED | OUTPATIENT
Start: 2020-07-09 | End: 2020-08-17 | Stop reason: SDUPTHER

## 2020-07-09 NOTE — PROGRESS NOTES
"FOLLOW-UP VISIT    Patient name: Sterling Toro  Date: 7/9/20      Subjective:       Patient ID: Sterling Toro is a 60 y.o. male.    Chief Complaint: Results    HPI  Presents today for follow-up, continues off any systemic therapy and is undergoing imaging to evaluate his metastatic esophageal cancer until he plans to move to [place] where he is from.  He reports today continued plan to move to [place] approximately Labor Day weekend.    Reviewed scanned in great detail today and overall would consider this a good result, there has been mild increase in 1 liver lesion and 1 new liver lesion, these are both not "bulky" and liver function is within normal limits as well as albumin which is very good.  CBC also good with mild decrease in hemoglobin.     His wife is present today for the 1st time so we again reviewed his recent diagnosis metastatic esophageal cancer, treatment options, his decision remains to declined systemic therapy until he relocates.  We discussed the based on results today scans labs and performance status, he is stable and can re-evaluate this at the time of his moved in 2 months.  Reviewed again that he is at risk of clinical decline and may not be a candidate for systemic therapy in the future, he expressed understanding.  He expressed interest in immunotherapy and we reviewed indication for this again.    He otherwise reports increasing pain, he is eating and denies weight loss, denies urinary symptoms or other GI symptoms.  The pain is the same in related to the area of the esophagus.  No headache shortness of breath.  His fatigue is increasing but he is still active with more breaks.  Lower stamina.  Does all ADLs and more    See detailed oncology history below, updated with most recent scans, labs, pertinent medical history.   Oncology History   Malignant neoplasm of lower third of esophagus   4/21/2020 Initial Diagnosis    Malignant neoplasm of lower third of esophagus    12/2019 - " reports start of dysphagia  3/24/2020 - PCP visit CC dysphagia, refer to GI  03/27/2020-GI visit  04/03/2020-Dr. Whaley - EGD-fungating mass distal esophagus, partial obstructing, easily bleeding, circumferential.  Mass located between 35-38.5 cm from incisors, location of GE junction.   Pathology:  Esophageal mass biopsy:  Invasive moderately differentiated adenocarcinoma arising in the background of Burris's esophagus.  HER2 fish is positive.  PDL1 is positive, score 50.  IHC testing for MMR proteins are normal, intact nuclear expression.  04/08/2020 orders placed for radiation oncology and surgery referrals, EUS.  4/10/20 - CT scans - a/p shows new right liver lobe lesion.  Also noted 3.9 x 4 x 3.7 cm region of masslike thickening in distal esophagus.  Scattered mild prominent mediastinal and hilar lymph nodes, largest 1.6 x 1.5 cm.  Also 2.1 x 2.2 cm necrotic gastrohepatic lymph node and 12 mm periportal lymph node.  EUS postponed, liver biopsy ordered  4/17/20 - liver core needle biopsy:  Pathology:  Carcinoma present, hepatocellular versus metastatic adenocarcinoma, favor the latter.  04/27/2020-medical oncology consult  7/8/20 - CT c/a/p - Impression:     1. Persistent circumferential thickening of the distal thoracic esophagus compatible with the patient's known malignancy with esophageal stent in place.  2. Ill-defined 1.9 cm hypoattenuating right hepatic lobe lesion, which appears new as compared to the prior study on 04/10/2020 in slightly enlarged as compared to prior CTA chest on 06/04/2020, concerning for metastatic disease.  Known metastatic lesion in the inferior right hepatic lobe has increased in size as compared to 04/10/2020, now measuring 3.3 cm.  3. Interval decrease in size of a necrotic gastrohepatic lymph node.  No significant lymphadenopathy elsewhere.  4. Stable left hepatic lobe cyst.  Additional subcentimeter hepatic hypodensities, which are too small to characterize and may represent  cysts.  5. Diverticulosis coli.  6. Bilateral renal cysts.  RECIST SUMMARY:     Date of prior examination for comparison: 06/04/2020 and 04/10/2020     Lesion 1: Right hepatic lobe.  3.3 cm. Series 2 image 109.  Prior measurement 1.6 cm.     Lesion 3: Right hepatic lobe.  1.9  cm. Series 2 image 82.  Prior measurement 1.1 cm.     Lesion 4: Gastrohepatic lymph node.  1.2 cm. Series 2 image 96.  Prior measurement 2.1 cm.     5/25/2020 -  Chemotherapy    Treatment Summary   Plan Name: OP FOLFOX 6 Q2W  Treatment Goal: Supportive  Status: Active  Start Date: 5/25/2020 (Planned)  End Date: 10/28/2020 (Planned)  Provider: Rama Valerio MD  Chemotherapy: fluorouraciL injection, 400 mg/m2, Intravenous, Clinic/HOD 1 time, 0 of 12 cycles  fluorouracil (ADRUCIL) in sodium chloride 0.9% 100 mL chemo infusion, 2,400 mg/m2, Intravenous, Over 46 hours, 0 of 12 cycles  leucovorin calcium in dextrose 5 % 250 mL infusion, 400 mg/m2, Intravenous, Clinic/HOD 1 time, 0 of 12 cycles  oxaliplatin (ELOXATIN) in dextrose 5 % 500 mL chemo infusion, 85 mg/m2, Intravenous, Clinic/HOD 1 time, 0 of 12 cycles  trastuzumab in sodium chloride 0.9% chemo infusion, 4 mg/kg = 314 mg (original dose ), Intravenous, Clinic/HOD 1 time, 0 of 12 cycles  Dose modification: 4 mg/kg (Cycle 1)         I have reviewed my initial consult note with detailed PMH/ROS from initial encounter and all following progress notes.   Past medical, surgical, family, and social history were reviewed today and there are no changes of note unless mentioned in HPI.     MEDS and ALLERGIES were reviewed with patient and meds reconciled.     Fatigue  Worsening but not severe    Weight/appetite  Stable   Constitutional  Denies F/C    Pain  Continues   Sleep  Ok    Mucositis  Denies    Cardiovascular  Denies CP    Respiratory  Denies SOB    Nausea  Denies    BMs  Normal    GI  Positive abdominal pain    Bleeding  Denies epistaxis, hemoptysis, BRBPR, melena, hematuria or any  "other bleeding    Extremities  Denies edema    Skin/nail/hair  Denies toxicity    Neuropathy  Denies    Psych  In good spirits    Misc  n/a    Exercise  None          Objective:      /68 (BP Location: Left arm, Patient Position: Sitting)   Pulse 79   Temp 97.7 °F (36.5 °C) (Temporal)   Resp 12   Ht 5' 5" (1.651 m)   Wt 68.7 kg (151 lb 7.3 oz)   SpO2 96%   BMI 25.20 kg/m²   Wt Readings from Last 30 Encounters:   07/09/20 68.7 kg (151 lb 7.3 oz)   06/18/20 70.4 kg (155 lb 3.3 oz)   06/04/20 69.9 kg (154 lb)   06/03/20 70.6 kg (155 lb 10.3 oz)   06/01/20 70.3 kg (154 lb 15.7 oz)   05/30/20 69.9 kg (154 lb 1.6 oz)   04/27/20 78.5 kg (173 lb 1 oz)   04/17/20 77.1 kg (170 lb)   09/13/19 95.2 kg (209 lb 14.4 oz)   01/10/19 97.9 kg (215 lb 12.8 oz)   09/29/18 94.1 kg (207 lb 7.3 oz)   01/26/18 93.2 kg (205 lb 6.4 oz)       Constitutional: Patient is oriented to person, place, and time. No distress.  He appears overall unchanged  HENT: Mouth/Throat: Oropharynx is clear and moist. No oropharyngeal exudate.   Eyes: Conjunctivae and EOM are normal.   Neck: Normal range of motion. Neck supple.   Cardiovascular: Normal rate, regular rhythm, normal heart sounds and intact distal pulses.    Pulmonary/Chest: Effort normal and breath sounds normal. no wheezes. no rales.    Abdominal: Soft. Bowel sounds are normal. Patient exhibits no distension. There is no tenderness.  Feeding tube in place  Musculoskeletal: patient exhibits no edema or tenderness.   Lymphadenopathy:  patient  has no cervical adenopathy. no supraclavicular adenopathy. No axillary LAD.   Neurological: Patient is alert and oriented to person, place, and time. normal strength. No cranial nerve deficit or sensory deficit.   Skin: Skin is warm and dry. No rash noted. No erythema.   Psychiatric: Patient has a normal mood and affect. speech is normal.   Nursing note and vitals reviewed.      CBC, CMP reviewed, noted are good results reviewed with " "patient  Assessment:       1. Malignant neoplasm of esophagus, unspecified location    2. Cancer associated pain    3. 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 Metastasis to liver    5. Anxiety about health    6. Neoplastic (malignant) related fatigue      Cancer Staging  Malignant neoplasm of lower third of esophagus  Staging form: Esophagus - Adenocarcinoma, AJCC 8th Edition  - Clinical stage from 4/27/2020: Stage IVB (cNX, pM1) - Unsigned      ECOG 1-2      60-year-old male with metastatic esophageal cancer to the liver who has declined systemic therapy and he is under "watch and wait" with plans to move to [place] in approximately 2 months where he is from and has more family and wants to start therapy there.  Reviewed his scans today and labs and clinical status are overall stable which is good, reviewed with wife 1st visit she is present today.      He has not had clinical decline or notable progression of disease.  Reviewed this in great detail with he and his wife.  They are going to continue with plan to move but concerned with relocation timing and this does not work out as planned, we reviewed we will schedule scans here in approximately 10 weeks and he can cancel this if he is moved or keep this appointment if that is delayed.  Advised if he does have any delay in his plan that we recommend starting systemic therapy if he does intend to start therapy at sometime in the future.    Reviewed pain control, nutrition, fatigue management      Plan:       Sterling was seen today for results.    Diagnoses and all orders for this visit:    Malignant neoplasm of esophagus, unspecified location  -     CT Chest Abdomen Pelvis W W/O Contrast (XPD); Future    Cancer associated pain  -     oxyCODONE-acetaminophen (PERCOCET) 7.5-325 mg per tablet; Take 1 tablet by mouth every 8 (eight) hours as needed for Pain.    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  Also reviewed bowel management while on opiates.    Exercise/nutrition important, reviewed    Continue current medications, reviewed.      Important to keep follow-up with PCP.     Discussed plan above in great detail with patient and all questions answered to their satisfaction. Proceed with plan above.  Greater than 50% of visit today was spent in discussing condition and continued treatment plan, counseling, education, for >40 minutes.          Rama Valerio M.D.  Hematology Oncology  [facility]  [facility]

## 2020-08-14 ENCOUNTER — TELEPHONE (OUTPATIENT)
Dept: HEMATOLOGY/ONCOLOGY | Facility: CLINIC | Age: 60
End: 2020-08-14

## 2020-08-14 NOTE — TELEPHONE ENCOUNTER
----- Message from Brittany Riggs sent at 8/14/2020 12:50 PM CDT -----  Regarding: CT scan  Contact: Mckayla wife  Type: Needs Medical Advice  Who Called:  Mckayla wife  Best Call Back Number: [phone]  Additional Information: Pls call regarding questions about the CT Scan that pt needs

## 2020-09-02 ENCOUNTER — HOSPITAL ENCOUNTER (OUTPATIENT)
Dept: RADIOLOGY | Facility: HOSPITAL | Age: 60
Discharge: HOME | End: 2020-09-02
Attending: INTERNAL MEDICINE
Payer: MEDICAID

## 2020-09-02 DIAGNOSIS — C15.9 MALIGNANT NEOPLASM OF ESOPHAGUS, UNSPECIFIED LOCATION: ICD-10-CM

## 2020-09-02 PROCEDURE — 74177 CT ABD & PELVIS W/CONTRAST: CPT | Mod: 26,,, | Performed by: RADIOLOGY

## 2020-09-02 PROCEDURE — 74177 CT ABD & PELVIS W/CONTRAST: CPT | Mod: TC,PO

## 2020-09-02 PROCEDURE — A9698 NON-RAD CONTRAST MATERIALNOC: HCPCS | Mod: PO | Performed by: INTERNAL MEDICINE

## 2020-09-02 PROCEDURE — 25500020 PHARM REV CODE 255: Mod: PO | Performed by: INTERNAL MEDICINE

## 2020-09-02 PROCEDURE — 71260 CT CHEST ABDOMEN PELVIS WITH CONTRAST (XPD): ICD-10-PCS | Mod: 26,,, | Performed by: RADIOLOGY

## 2020-09-02 PROCEDURE — 74177 CT CHEST ABDOMEN PELVIS WITH CONTRAST (XPD): ICD-10-PCS | Mod: 26,,, | Performed by: RADIOLOGY

## 2020-09-02 PROCEDURE — 71260 CT THORAX DX C+: CPT | Mod: 26,,, | Performed by: RADIOLOGY

## 2020-09-02 RX ADMIN — IOHEXOL 75 ML: 350 INJECTION, SOLUTION INTRAVENOUS at 10:09

## 2020-09-02 RX ADMIN — IOHEXOL 1000 ML: 9 SOLUTION ORAL at 10:09

## 2020-09-03 ENCOUNTER — OFFICE VISIT (OUTPATIENT)
Dept: HEMATOLOGY/ONCOLOGY | Facility: CLINIC | Age: 60
End: 2020-09-03
Payer: MEDICAID

## 2020-09-03 ENCOUNTER — LAB VISIT (OUTPATIENT)
Dept: LAB | Facility: HOSPITAL | Age: 60
End: 2020-09-03
Attending: INTERNAL MEDICINE
Payer: MEDICAID

## 2020-09-03 VITALS
OXYGEN SATURATION: 97 % | HEART RATE: 72 BPM | TEMPERATURE: 99 F | WEIGHT: 150.81 LBS | DIASTOLIC BLOOD PRESSURE: 73 MMHG | SYSTOLIC BLOOD PRESSURE: 115 MMHG | BODY MASS INDEX: 25.13 KG/M2 | HEIGHT: 65 IN

## 2020-09-03 DIAGNOSIS — C78.7 METASTASIS TO LIVER: ICD-10-CM

## 2020-09-03 DIAGNOSIS — R45.89 ANXIETY ABOUT HEALTH: ICD-10-CM

## 2020-09-03 DIAGNOSIS — G89.3 CANCER ASSOCIATED PAIN: ICD-10-CM

## 2020-09-03 DIAGNOSIS — C15.9 MALIGNANT NEOPLASM OF ESOPHAGUS, UNSPECIFIED LOCATION: Primary | ICD-10-CM

## 2020-09-03 DIAGNOSIS — C15.9 MALIGNANT NEOPLASM OF ESOPHAGUS, UNSPECIFIED LOCATION: ICD-10-CM

## 2020-09-03 DIAGNOSIS — C77.9 MALIGNANT NEOPLASM METASTATIC TO LYMPH NODES, UNSPECIFIED LYMPH NODE REGION: ICD-10-CM

## 2020-09-03 LAB
ALBUMIN SERPL BCP-MCNC: 4.5 G/DL (ref 3.5–5.2)
ALP SERPL-CCNC: 62 U/L (ref 38–145)
ALT SERPL W/O P-5'-P-CCNC: 15 U/L (ref 0–50)
ANION GAP SERPL CALC-SCNC: 7 MMOL/L (ref 8–16)
AST SERPL-CCNC: 25 U/L (ref 17–59)
BASOPHILS # BLD AUTO: 0.05 K/UL (ref 0–0.2)
BASOPHILS NFR BLD: 1.1 % (ref 0–1.9)
BILIRUB SERPL-MCNC: 0.4 MG/DL (ref 0.2–1.3)
BUN SERPL-MCNC: 22 MG/DL (ref 9–21)
CALCIUM SERPL-MCNC: 9.4 MG/DL (ref 8.4–10.2)
CHLORIDE SERPL-SCNC: 100 MMOL/L (ref 95–110)
CO2 SERPL-SCNC: 30 MMOL/L (ref 22–31)
CREAT SERPL-MCNC: 0.78 MG/DL (ref 0.5–1.4)
DIFFERENTIAL METHOD: ABNORMAL
EOSINOPHIL # BLD AUTO: 0.2 K/UL (ref 0–0.5)
EOSINOPHIL NFR BLD: 3.6 % (ref 0–8)
ERYTHROCYTE [DISTWIDTH] IN BLOOD BY AUTOMATED COUNT: 13.7 % (ref 11.5–14.5)
EST. GFR  (AFRICAN AMERICAN): >60 ML/MIN/1.73 M^2
EST. GFR  (NON AFRICAN AMERICAN): >60 ML/MIN/1.73 M^2
GLUCOSE SERPL-MCNC: 93 MG/DL (ref 70–110)
HCT VFR BLD AUTO: 40.4 % (ref 40–54)
HGB BLD-MCNC: 13.4 G/DL (ref 14–18)
IMM GRANULOCYTES # BLD AUTO: 0.01 K/UL (ref 0–0.04)
IMM GRANULOCYTES NFR BLD AUTO: 0.2 % (ref 0–0.5)
LYMPHOCYTES # BLD AUTO: 0.7 K/UL (ref 1–4.8)
LYMPHOCYTES NFR BLD: 14.5 % (ref 18–48)
MAGNESIUM SERPL-MCNC: 2.2 MG/DL (ref 1.6–2.6)
MCH RBC QN AUTO: 31.8 PG (ref 27–31)
MCHC RBC AUTO-ENTMCNC: 33.2 G/DL (ref 32–36)
MCV RBC AUTO: 96 FL (ref 82–98)
MONOCYTES # BLD AUTO: 0.8 K/UL (ref 0.3–1)
MONOCYTES NFR BLD: 16.2 % (ref 4–15)
NEUTROPHILS # BLD AUTO: 3 K/UL (ref 1.8–7.7)
NEUTROPHILS NFR BLD: 64.4 % (ref 38–73)
NRBC BLD-RTO: 0 /100 WBC
PHOSPHATE SERPL-MCNC: 4.5 MG/DL (ref 2.7–4.5)
PLATELET # BLD AUTO: 270 K/UL (ref 150–350)
PMV BLD AUTO: 9.7 FL (ref 9.2–12.9)
POTASSIUM SERPL-SCNC: 4.3 MMOL/L (ref 3.5–5.1)
PROT SERPL-MCNC: 7.4 G/DL (ref 6–8.4)
RBC # BLD AUTO: 4.22 M/UL (ref 4.6–6.2)
SODIUM SERPL-SCNC: 137 MMOL/L (ref 136–145)
WBC # BLD AUTO: 4.68 K/UL (ref 3.9–12.7)

## 2020-09-03 PROCEDURE — 80053 COMPREHEN METABOLIC PANEL: CPT

## 2020-09-03 PROCEDURE — 99999 PR PBB SHADOW E&M-EST. PATIENT-LVL III: CPT | Mod: PBBFAC,,, | Performed by: INTERNAL MEDICINE

## 2020-09-03 PROCEDURE — 84100 ASSAY OF PHOSPHORUS: CPT | Mod: PN

## 2020-09-03 PROCEDURE — 99999 PR PBB SHADOW E&M-EST. PATIENT-LVL III: ICD-10-PCS | Mod: PBBFAC,,, | Performed by: INTERNAL MEDICINE

## 2020-09-03 PROCEDURE — 83735 ASSAY OF MAGNESIUM: CPT

## 2020-09-03 PROCEDURE — 99213 OFFICE O/P EST LOW 20 MIN: CPT | Mod: PBBFAC,PN | Performed by: INTERNAL MEDICINE

## 2020-09-03 PROCEDURE — 99214 OFFICE O/P EST MOD 30 MIN: CPT | Mod: S$PBB,,, | Performed by: INTERNAL MEDICINE

## 2020-09-03 PROCEDURE — 85025 COMPLETE CBC W/AUTO DIFF WBC: CPT | Mod: PN

## 2020-09-03 PROCEDURE — 85025 COMPLETE CBC W/AUTO DIFF WBC: CPT

## 2020-09-03 PROCEDURE — 36415 COLL VENOUS BLD VENIPUNCTURE: CPT | Mod: PN

## 2020-09-03 PROCEDURE — 80053 COMPREHEN METABOLIC PANEL: CPT | Mod: PN

## 2020-09-03 PROCEDURE — 99214 PR OFFICE/OUTPT VISIT, EST, LEVL IV, 30-39 MIN: ICD-10-PCS | Mod: S$PBB,,, | Performed by: INTERNAL MEDICINE

## 2020-09-03 PROCEDURE — 83735 ASSAY OF MAGNESIUM: CPT | Mod: PN

## 2020-09-03 PROCEDURE — 84100 ASSAY OF PHOSPHORUS: CPT

## 2020-09-03 RX ORDER — MORPHINE SULFATE 15 MG/1
15 TABLET, FILM COATED, EXTENDED RELEASE ORAL 2 TIMES DAILY
Qty: 60 TABLET | Refills: 0 | Status: SHIPPED | OUTPATIENT
Start: 2020-09-03 | End: 2020-09-10 | Stop reason: SDUPTHER

## 2020-09-03 NOTE — PROGRESS NOTES
FOLLOW-UP VISIT    Patient name: Sterling Toro  Date: 9/3/20      Subjective:       Patient ID: Sterling Toro is a 60 y.o. male.    Chief Complaint: Results    HPI  Here today for his metastatic esophageal cancer which she has never pursue systemic therapy.  New scans show no progression disease.  Area prior liver metastasis in appears smaller, see report.    He reports continues with severe pain chest and back ever since stent placement and wants to pursue having stent removed    He is not losing weight appetite is good he is fatigued but continues all ADLs and very physically active, construction.  He is moving to [place] in 2 days, but will return back and forth over the next month or 2 since selling his house here [place] and needs to continue and complete dental work.    Has continued pain medication and we reviewed pain medication plan and importance of follow-up getting established when he goes to [place] immediately including oncology and probably pain management.    No fever chills cough nausea or vomiting.  No blood in stool.  No stomach pain.  No change in urination.    See detailed oncology history below, updated with most recent scans, labs, pertinent medical history.   Oncology History   Malignant neoplasm of lower third of esophagus   4/21/2020 Initial Diagnosis    Malignant neoplasm of lower third of esophagus    12/2019 - reports start of dysphagia  3/24/2020 - PCP visit CC dysphagia, refer to GI  03/27/2020-GI visit  04/03/2020-Dr. Whaley - EGD-fungating mass distal esophagus, partial obstructing, easily bleeding, circumferential.  Mass located between 35-38.5 cm from incisors, location of GE junction.   Pathology:  Esophageal mass biopsy:  Invasive moderately differentiated adenocarcinoma arising in the background of Burris's esophagus.  HER2 fish is positive.  PDL1 is positive, score 50.  IHC testing for MMR proteins are normal, intact nuclear expression.  04/08/2020 orders placed for  radiation oncology and surgery referrals, EUS.  4/10/20 - CT scans - a/p shows new right liver lobe lesion.  Also noted 3.9 x 4 x 3.7 cm region of masslike thickening in distal esophagus.  Scattered mild prominent mediastinal and hilar lymph nodes, largest 1.6 x 1.5 cm.  Also 2.1 x 2.2 cm necrotic gastrohepatic lymph node and 12 mm periportal lymph node.  EUS postponed, liver biopsy ordered  4/17/20 - liver core needle biopsy:  Pathology:  Carcinoma present, hepatocellular versus metastatic adenocarcinoma, favor the latter.  04/27/2020-medical oncology consult  7/8/20 - CT c/a/p - Impression:     1. Persistent circumferential thickening of the distal thoracic esophagus compatible with the patient's known malignancy with esophageal stent in place.  2. Ill-defined 1.9 cm hypoattenuating right hepatic lobe lesion, which appears new as compared to the prior study on 04/10/2020 in slightly enlarged as compared to prior CTA chest on 06/04/2020, concerning for metastatic disease.  Known metastatic lesion in the inferior right hepatic lobe has increased in size as compared to 04/10/2020, now measuring 3.3 cm.  3. Interval decrease in size of a necrotic gastrohepatic lymph node.  No significant lymphadenopathy elsewhere.  4. Stable left hepatic lobe cyst.  Additional subcentimeter hepatic hypodensities, which are too small to characterize and may represent cysts.  5. Diverticulosis coli.  6. Bilateral renal cysts.  RECIST SUMMARY:     Date of prior examination for comparison: 06/04/2020 and 04/10/2020     Lesion 1: Right hepatic lobe.  3.3 cm. Series 2 image 109.  Prior measurement 1.6 cm.     Lesion 3: Right hepatic lobe.  1.9  cm. Series 2 image 82.  Prior measurement 1.1 cm.     Lesion 4: Gastrohepatic lymph node.  1.2 cm. Series 2 image 96.  Prior measurement 2.1 cm.     5/25/2020 -  Chemotherapy    Treatment Summary   Plan Name: OP FOLFOX 6 Q2W  Treatment Goal: Supportive  Status: Active  Start Date: 5/25/2020  "(Planned)  End Date: 10/28/2020 (Planned)  Provider: Rama Valerio MD  Chemotherapy: fluorouraciL injection, 400 mg/m2, Intravenous, Clinic/HOD 1 time, 0 of 12 cycles  fluorouracil (ADRUCIL) in sodium chloride 0.9% 100 mL chemo infusion, 2,400 mg/m2, Intravenous, Over 46 hours, 0 of 12 cycles  leucovorin calcium in dextrose 5 % 250 mL infusion, 400 mg/m2, Intravenous, Clinic/HOD 1 time, 0 of 12 cycles  oxaliplatin (ELOXATIN) in dextrose 5 % 500 mL chemo infusion, 85 mg/m2, Intravenous, Clinic/HOD 1 time, 0 of 12 cycles  trastuzumab in sodium chloride 0.9% chemo infusion, 4 mg/kg = 314 mg (original dose ), Intravenous, Clinic/HOD 1 time, 0 of 12 cycles  Dose modification: 4 mg/kg (Cycle 1)         I have reviewed my initial consult note with detailed PMH/ROS from initial encounter and all following progress notes.   Past medical, surgical, family, and social history were reviewed today and there are no changes of note unless mentioned in HPI.     MEDS and ALLERGIES were reviewed with patient and meds reconciled.     Fatigue  Present    Weight/appetite  Good    Constitutional  Denies F/C    Pain  See HPI   Sleep  Ok    Mucositis  Denies    Cardiovascular  Denies CP    Respiratory  Denies SOB    Nausea  Denies    BMs  Normal    GI  Denies abdominal pain    Bleeding  Denies epistaxis, hemoptysis, BRBPR, melena, hematuria or any other bleeding    Extremities  Denies edema    Skin/nail/hair  Denies toxicity    Neuropathy  Denies    Psych  In good spirits    Misc  n/a    Exercise  Active          Objective:      /73 (BP Location: Left arm, Patient Position: Sitting)   Pulse 72   Temp 98.5 °F (36.9 °C) (Temporal)   Ht 5' 5" (1.651 m)   Wt 68.4 kg (150 lb 12.7 oz)   SpO2 97%   BMI 25.09 kg/m²   Wt Readings from Last 30 Encounters:   09/03/20 68.4 kg (150 lb 12.7 oz)   07/09/20 68.7 kg (151 lb 7.3 oz)   06/18/20 70.4 kg (155 lb 3.3 oz)   06/04/20 69.9 kg (154 lb)   06/03/20 70.6 kg (155 lb 10.3 oz) "   06/01/20 70.3 kg (154 lb 15.7 oz)   05/30/20 69.9 kg (154 lb 1.6 oz)   04/27/20 78.5 kg (173 lb 1 oz)   04/17/20 77.1 kg (170 lb)   09/13/19 95.2 kg (209 lb 14.4 oz)   01/10/19 97.9 kg (215 lb 12.8 oz)   09/29/18 94.1 kg (207 lb 7.3 oz)   01/26/18 93.2 kg (205 lb 6.4 oz)       Constitutional: Patient is oriented to person, place, and time. No distress.  Appears unchanged since last visit  HENT: Mouth/Throat: Oropharynx is clear and moist. No oropharyngeal exudate.   Eyes: Conjunctivae and EOM are normal.   Neck: Normal range of motion. Neck supple.   Cardiovascular: Normal rate, regular rhythm, normal heart sounds and intact distal pulses.    Pulmonary/Chest: Effort normal and breath sounds normal. no wheezes. no rales.    Abdominal: Soft. Bowel sounds are normal. Patient exhibits no distension. There is no tenderness.   Musculoskeletal: patient exhibits no edema or tenderness.   Lymphadenopathy:  patient  has no cervical adenopathy. no supraclavicular adenopathy. No axillary LAD.   Neurological: Patient is alert and oriented to person, place, and time. normal strength. No cranial nerve deficit or sensory deficit.   Skin: Skin is warm and dry. No rash noted. No erythema.   Psychiatric: Patient has a normal mood and affect. speech is normal.   Nursing note and vitals reviewed.      CBC, CMP reviewed, noted are good results reviewed with patient and wife  Assessment:       1. Malignant neoplasm of esophagus, unspecified location    2. Cancer associated pain    3. 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 Metastasis to liver    5. Anxiety about health      Cancer Staging  Malignant neoplasm of lower third of esophagus  Staging form: Esophagus - Adenocarcinoma, AJCC 8th Edition  - Clinical stage from 4/27/2020: Stage IVB (cNX, pM1) - Unsigned      ECOG 1-2      60-year-old male with metastatic esophageal cancer declined systemic therapy earlier this year and fortunately on last  few scans has not shown any sign of progression of disease.  Reviewed at length.  He is relocating to [place] this month.  Reviewed current control of symptoms and pain control plan      Plan:       Sterling was seen today for results.    Diagnoses and all orders for this visit:    Malignant neoplasm of esophagus, unspecified location  -     CBC auto differential  -     Comprehensive metabolic panel  -     Magnesium  -     Phosphorus    Cancer associated pain  -     morphine (MS CONTIN) 15 MG 12 hr tablet; Take 1 tablet (15 mg total) by mouth 2 (two) times daily.    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 de leon to consider stent removal and schedule this    Exercise/nutrition important, reviewed    Continue current medications, reviewed.      Important to keep follow-up with PCP and establish PCP, pain mgmt, oncology with relocation.  Reviewed this process at length today.     Discussed plan above in great detail with patient and his wife and all questions answered to their satisfaction. Proceed with plan above.          Rama Valerio M.D.  Hematology Oncology  [facility]

## 2020-09-21 ENCOUNTER — TELEPHONE (OUTPATIENT)
Dept: HEMATOLOGY/ONCOLOGY | Facility: CLINIC | Age: 60
End: 2020-09-21

## 2020-09-21 NOTE — TELEPHONE ENCOUNTER
----- Message from Brittany Riggs sent at 9/21/2020 12:43 PM CDT -----  Regarding: pharm  Contact: Jared canada/ Barbara michel  Type:  Pharmacy Calling to Clarify an RX    Name of Caller:  Jared  Pharmacy Name:  [facility]  Prescription Name:   morphine (MS CONTIN) 15 MG 12 hr tablet   What do they need to clarify?:  needs to be verified by provider  Best Call Back Number:  [phone]  Additional Information:  Pls call pharm to adv

## 2020-09-29 ENCOUNTER — HOSPITAL ENCOUNTER (OUTPATIENT)
Dept: PREADMISSION TESTING | Facility: HOSPITAL | Age: 60
Discharge: HOME | End: 2020-09-29
Attending: INTERNAL MEDICINE
Payer: MEDICAID

## 2020-09-29 VITALS
RESPIRATION RATE: 14 BRPM | HEIGHT: 65 IN | DIASTOLIC BLOOD PRESSURE: 74 MMHG | OXYGEN SATURATION: 96 % | TEMPERATURE: 99 F | HEART RATE: 70 BPM | SYSTOLIC BLOOD PRESSURE: 114 MMHG | WEIGHT: 158.75 LBS | BODY MASS INDEX: 26.45 KG/M2

## 2020-09-29 DIAGNOSIS — Z01.818 PREOP TESTING: ICD-10-CM

## 2020-09-29 PROCEDURE — U0003 INFECTIOUS AGENT DETECTION BY NUCLEIC ACID (DNA OR RNA); SEVERE ACUTE RESPIRATORY SYNDROME CORONAVIRUS 2 (SARS-COV-2) (CORONAVIRUS DISEASE [COVID-19]), AMPLIFIED PROBE TECHNIQUE, MAKING USE OF HIGH THROUGHPUT TECHNOLOGIES AS DESCRIBED BY CMS-2020-01-R: HCPCS

## 2020-09-29 RX ORDER — IBUPROFEN 400 MG/1
400 TABLET ORAL EVERY 4 HOURS PRN
COMMUNITY

## 2020-09-29 RX ORDER — ACETAMINOPHEN 500 MG
1000 TABLET ORAL EVERY 6 HOURS PRN
COMMUNITY

## 2020-09-29 RX ORDER — NAPROXEN SODIUM 220 MG
440 TABLET ORAL
COMMUNITY

## 2020-09-30 LAB — SARS-COV-2 RNA RESP QL NAA+PROBE: NOT DETECTED

## 2020-10-09 ENCOUNTER — HOSPITAL ENCOUNTER (OUTPATIENT)
Dept: PREADMISSION TESTING | Facility: HOSPITAL | Age: 60
Discharge: HOME | End: 2020-10-09
Attending: INTERNAL MEDICINE
Payer: MEDICAID

## 2020-10-09 PROCEDURE — U0003 INFECTIOUS AGENT DETECTION BY NUCLEIC ACID (DNA OR RNA); SEVERE ACUTE RESPIRATORY SYNDROME CORONAVIRUS 2 (SARS-COV-2) (CORONAVIRUS DISEASE [COVID-19]), AMPLIFIED PROBE TECHNIQUE, MAKING USE OF HIGH THROUGHPUT TECHNOLOGIES AS DESCRIBED BY CMS-2020-01-R: HCPCS

## 2020-10-10 LAB — SARS-COV-2 RNA RESP QL NAA+PROBE: NOT DETECTED

## 2020-10-12 ENCOUNTER — ANESTHESIA EVENT (OUTPATIENT)
Dept: SURGERY | Facility: HOSPITAL | Age: 60
End: 2020-10-12
Payer: MEDICAID

## 2020-10-12 ENCOUNTER — HOSPITAL ENCOUNTER (OUTPATIENT)
Facility: HOSPITAL | Age: 60
Discharge: HOME | End: 2020-10-12
Attending: INTERNAL MEDICINE | Admitting: INTERNAL MEDICINE
Payer: MEDICAID

## 2020-10-12 ENCOUNTER — HOSPITAL ENCOUNTER (OUTPATIENT)
Dept: RADIOLOGY | Facility: HOSPITAL | Age: 60
Discharge: HOME | End: 2020-10-12
Attending: INTERNAL MEDICINE | Admitting: INTERNAL MEDICINE
Payer: MEDICAID

## 2020-10-12 ENCOUNTER — ANESTHESIA (OUTPATIENT)
Dept: SURGERY | Facility: HOSPITAL | Age: 60
End: 2020-10-12
Payer: MEDICAID

## 2020-10-12 VITALS
RESPIRATION RATE: 3 BRPM | DIASTOLIC BLOOD PRESSURE: 67 MMHG | HEART RATE: 71 BPM | SYSTOLIC BLOOD PRESSURE: 119 MMHG | TEMPERATURE: 98 F | OXYGEN SATURATION: 97 %

## 2020-10-12 DIAGNOSIS — C15.9 ESOPHAGEAL CANCER: ICD-10-CM

## 2020-10-12 PROCEDURE — 27201030 HC OVERTUBE: Performed by: INTERNAL MEDICINE

## 2020-10-12 PROCEDURE — 25000003 PHARM REV CODE 250: Performed by: NURSE ANESTHETIST, CERTIFIED REGISTERED

## 2020-10-12 PROCEDURE — 00813 ANES UPR LWR GI NDSC PX: CPT | Performed by: INTERNAL MEDICINE

## 2020-10-12 PROCEDURE — 27000671 HC TUBING MICROBORE EXT: Performed by: NURSE ANESTHETIST, CERTIFIED REGISTERED

## 2020-10-12 PROCEDURE — 27100019 HC AMBU BAG ADULT/PED: Performed by: NURSE ANESTHETIST, CERTIFIED REGISTERED

## 2020-10-12 PROCEDURE — 63600175 PHARM REV CODE 636 W HCPCS: Performed by: NURSE ANESTHETIST, CERTIFIED REGISTERED

## 2020-10-12 PROCEDURE — 27202103: Performed by: NURSE ANESTHETIST, CERTIFIED REGISTERED

## 2020-10-12 PROCEDURE — 37000009 HC ANESTHESIA EA ADD 15 MINS: Performed by: INTERNAL MEDICINE

## 2020-10-12 PROCEDURE — 37000008 HC ANESTHESIA 1ST 15 MINUTES: Performed by: INTERNAL MEDICINE

## 2020-10-12 PROCEDURE — 27000675 HC TUBING MICRODRIP: Performed by: NURSE ANESTHETIST, CERTIFIED REGISTERED

## 2020-10-12 PROCEDURE — 27202436 HC GRASPER DEVICE, RAT TOOTH: Performed by: INTERNAL MEDICINE

## 2020-10-12 PROCEDURE — 43247 EGD REMOVE FOREIGN BODY: CPT | Performed by: INTERNAL MEDICINE

## 2020-10-12 RX ORDER — SODIUM CHLORIDE 9 MG/ML
INJECTION, SOLUTION INTRAVENOUS CONTINUOUS
Status: DISCONTINUED | OUTPATIENT
Start: 2020-10-12 | End: 2020-10-12 | Stop reason: HOSPADM

## 2020-10-12 RX ORDER — SODIUM CHLORIDE 0.9 % (FLUSH) 0.9 %
2 SYRINGE (ML) INJECTION
Status: DISCONTINUED | OUTPATIENT
Start: 2020-10-12 | End: 2020-10-12 | Stop reason: HOSPADM

## 2020-10-12 RX ORDER — SODIUM CHLORIDE 9 MG/ML
INJECTION, SOLUTION INTRAVENOUS CONTINUOUS PRN
Status: DISCONTINUED | OUTPATIENT
Start: 2020-10-12 | End: 2020-10-12

## 2020-10-12 RX ORDER — PROPOFOL 10 MG/ML
VIAL (ML) INTRAVENOUS
Status: DISCONTINUED | OUTPATIENT
Start: 2020-10-12 | End: 2020-10-12

## 2020-10-12 RX ADMIN — PROPOFOL 50 MG: 10 INJECTION, EMULSION INTRAVENOUS at 11:10

## 2020-10-12 RX ADMIN — SODIUM CHLORIDE: 0.9 INJECTION, SOLUTION INTRAVENOUS at 11:10

## 2020-10-12 NOTE — ANESTHESIA PREPROCEDURE EVALUATION
10/12/2020  Sterling Toro is a 60 y.o., male.    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    Metastasis to liver    Weight loss, abnormal    Dysphagia    Esophageal mass    Esophageal cancer       Past Surgical History:   Procedure Laterality Date    ESOPHAGOGASTRODUODENOSCOPY Left 5/29/2020    Procedure: EGD (ESOPHAGOGASTRODUODENOSCOPY);  Surgeon: Benoit Whaley MD;  [facility];  Service: Endoscopy;  Laterality: Left;    ESOPHAGOGASTRODUODENOSCOPY N/A 6/3/2020    Procedure: EGD (ESOPHAGOGASTRODUODENOSCOPY)with metal stent placement;  Surgeon: Bobo Carpenter III, MD;  Location: [facility];  Service: Endoscopy;  Laterality: N/A;    GASTROSTOMY TUBE PLACEMENT          Tobacco Use:  The patient  reports that he quit smoking about 22 years ago. His smoking use included cigarettes. He started smoking about 50 years ago. He smoked 1.00 pack per day. He has never used smokeless tobacco.     Results for orders placed or performed during the hospital encounter of 06/04/20   EKG 12-lead    Collection Time: 06/04/20 11:08 AM    Narrative    Test Reason : R07.9,    Vent. Rate : 102 BPM     Atrial Rate : 102 BPM     P-R Int : 132 ms          QRS Dur : 070 ms      QT Int : 320 ms       P-R-T Axes : 065 073 038 degrees     QTc Int : 417 ms    Sinus tachycardia  Otherwise normal ECG  When compared with ECG of 28-MAY-2020 15:16,  Premature atrial complexes are no longer Present  Criteria for Septal infarct are no longer Present  Confirmed by Parrish LAMAR, Johan JIMENEZ ([id number]) on 6/4/2020 1:38:11 PM    Referred By: [email or web address]   SELF           Confirmed By:Johan Senior MD             Lab Results   Component Value Date    WBC 4.68 09/03/2020    HGB 13.4 (L)  09/03/2020    HCT 40.4 09/03/2020    MCV 96 09/03/2020     09/03/2020     BMP  Lab Results   Component Value Date     09/03/2020    K 4.3 09/03/2020     09/03/2020    CO2 30 09/03/2020    BUN 22 (H) 09/03/2020    CREATININE 0.78 09/03/2020    CALCIUM 9.4 09/03/2020    ANIONGAP 7 (L) 09/03/2020    GLU 93 09/03/2020     09/02/2020    GLU 93 07/08/2020       No results found for this or any previous visit.      Pre-op Assessment    I have reviewed the Patient Summary Reports.     I have reviewed the Nursing Notes. I have reviewed the NPO Status.   I have reviewed the Medications.     Review of Systems  Anesthesia Hx:  No problems with previous Anesthesia Denies Hx of Anesthetic complications  Denies Family Hx of Anesthesia complications.   Denies Personal Hx of Anesthesia complications.   Social:  No Alcohol Use, Former Smoker    Hematology/Oncology:  Hematology Normal      Current/Recent Cancer. radiation Oncology Comments: Metastatic Esophageal CA     EENT/Dental:EENT/Dental Normal   Cardiovascular:   PAULINO ECG has been reviewed.    Pulmonary:   Shortness of breath    Renal/:   Denies Chronic Renal Disease. 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/Liver  S/p PEG 5-30-20  For malnutrition and dysphagia     Musculoskeletal:  Musculoskeletal Normal Gout   Neurological:  Neurology Normal    Endocrine:  Endocrine Normal    Psych:  Psychiatric Normal           Physical Exam  General:  Well nourished    Airway/Jaw/Neck:  Airway Findings: Mouth Opening: Normal Tongue: Normal  General Airway Assessment: Good  Mallampati: II  TM Distance: Normal, at least 6 cm         Dental:  Dental Findings: Loose teeth    Chest/Lungs:  Chest/Lungs Findings: Clear to auscultation, Normal Respiratory Rate     Heart/Vascular:  Heart Findings: Rate: Normal  Rhythm: Regular Rhythm   Sounds: Normal        Mental Status:  Mental Status Findings:  Cooperative, Alert and Oriented         Anesthesia Plan  Type of Anesthesia, risks & benefits discussed:  Anesthesia Type:  MAC  Patient's Preference:   Intra-op Monitoring Plan: standard ASA monitors  Intra-op Monitoring Plan Comments:   Post Op Pain Control Plan: IV/PO Opioids PRN and per primary service following discharge from PACU  Post Op Pain Control Plan Comments:   Induction:   IV  Beta Blocker:  Patient is not currently on a Beta-Blocker (No further documentation required).       Informed Consent: Patient understands risks and agrees with Anesthesia plan.  Questions answered. Anesthesia consent signed with patient.  ASA Score: 3     Day of Surgery Review of History & Physical: I have interviewed and examined the patient. I have reviewed the patient's H&P dated:    H&P update referred to the provider.     Anesthesia Plan Notes: The patient has been educated and understands the risk for dental injury  MAC with Propofol  POM Mask        Ready For Surgery From Anesthesia Perspective.

## 2020-10-12 NOTE — ANESTHESIA POSTPROCEDURE EVALUATION
Anesthesia Post Evaluation    Patient: Sterling Toro    Procedure(s) Performed: Procedure(s) (LRB):  EGD (ESOPHAGOGASTRODUODENOSCOPY) WITH FLUORO (N/A)    Final Anesthesia Type: MAC    Patient location during evaluation: GI PACU  Patient participation: Yes- Able to Participate  Level of consciousness: awake and alert, oriented and awake  Post-procedure vital signs: reviewed and stable  Pain management: adequate  Airway patency: patent    PONV status at discharge: No PONV  Anesthetic complications: no      Cardiovascular status: blood pressure returned to baseline, hemodynamically stable and stable  Respiratory status: unassisted, spontaneous ventilation and room air  Hydration status: euvolemic  Follow-up not needed.  Comments: The patient states that he was comfortable for the procedure and is without recall the procedure.          Vitals Value Taken Time   /67 10/12/20 1200   Temp 36.6 °C (97.9 °F) 10/12/20 1145   Pulse 80 10/12/20 1201   Resp 16 10/12/20 1201   SpO2 94 % 10/12/20 1201   Vitals shown include unvalidated device data.      No case tracking events are documented in the log.      Pain/Gilbert Score: No data recorded

## 2020-10-12 NOTE — H&P
GASTROENTEROLOGY PRE-PROCEDURE H&P NOTE  Patient Name: Sterling Toro  Patient MRN: [id number]  Patient : 1960    Service date: 10/12/2020    PCP: Marylin Canas MD    No chief complaint on file.      HPI: Patient is a 60 y.o. male with PMHx as below here for evaluation of esophageal stent removal.     Past Medical History:  Past Medical History:   Diagnosis Date    Acute pain     r/t stent placed in esophagus  chest area back and rt side of chest    Arthritis     Cancer     esophageal     PAULINO (dyspnea on exertion)     Dysphagia     GERD (gastroesophageal reflux disease)     Gout     Kidney stone     Kidney stone     Metastatic cancer to liver     radiation        Past Surgical History:  Past Surgical History:   Procedure Laterality Date    ESOPHAGOGASTRODUODENOSCOPY Left 2020    Procedure: EGD (ESOPHAGOGASTRODUODENOSCOPY);  Surgeon: Benoit Whaley MD;  [facility];  Service: Endoscopy;  Laterality: Left;    ESOPHAGOGASTRODUODENOSCOPY N/A 6/3/2020    Procedure: EGD (ESOPHAGOGASTRODUODENOSCOPY)with metal stent placement;  Surgeon: Bobo Carpenter III, MD;  Location: [facility];  Service: Endoscopy;  Laterality: N/A;    GASTROSTOMY TUBE PLACEMENT          Home Medications:  Medications Prior to Admission   Medication Sig Dispense Refill Last Dose    acetaminophen (TYLENOL) 500 MG tablet Take 1,000 mg by mouth every 6 (six) hours as needed for Pain.   10/12/2020 at Unknown time    ibuprofen (ADVIL,MOTRIN) 400 MG tablet Take 400 mg by mouth every 4 (four) hours as needed for Other.   Past Week at Unknown time    oxyCODONE-acetaminophen (PERCOCET) 7.5-325 mg per tablet Take 1 tablet by mouth every 12 (twelve) hours as needed for Pain. 40 tablet 0 10/11/2020 at Unknown time    pantoprazole (PROTONIX) 40 MG tablet Take 1 tablet (40 mg total) by mouth once daily. (Patient taking differently: Take 40 mg by mouth 2 (two) times daily. ) 30 tablet 6 10/11/2020 at Unknown time     PAPAYA ENZYME ORAL Take 1 Dose by mouth once daily.   10/11/2020 at Unknown time    lactose-reduced food with fibr (JEVITY 1.5 ALEJO) 0.06 gram-1.5 kcal/mL Liqd Take 240 mLs by mouth 5 (five) times daily. Jevity 1.5 tube feeding via PEG. 5 cans per day with 240 ML water flush q 6 hours to provide: 1800 kcal, 77 grams protein (>90% of ENN) and 1872 mL free water.       morphine (MS CONTIN) 15 MG 12 hr tablet Take 1 tablet (15 mg total) by mouth 2 (two) times daily. (Patient taking differently: Take 15 mg by mouth as needed. ) 60 tablet 0 Unknown at Unknown time    naproxen sodium (ANAPROX) 220 MG tablet Take 440 mg by mouth as needed.   Unknown at Unknown time    ondansetron (ZOFRAN) 8 MG tablet Take 8 mg by mouth every 8 (eight) hours as needed for Nausea.    Unknown at Unknown time       Inpatient Medications:    sodium chloride 0.9%    Review of patient's allergies indicates:  No Known Allergies    Social History:   Social History     Occupational History    Not on file   Tobacco Use    Smoking status: Former Smoker     Packs/day: 1.00     Types: Cigarettes     Start date:      Quit date:      Years since quittin.7    Smokeless tobacco: Never Used    Tobacco comment: stopped 20 years ago   Substance and Sexual Activity    Alcohol use: Not Currently     Alcohol/week: 3.0 standard drinks     Types: 1 Glasses of wine, 1 Cans of beer, 1 Shots of liquor per week     Comment: occasional    Drug use: Yes     Types: Marijuana     Comment: monthly    Sexual activity: Not on file       Family History:   Family History   Problem Relation Age of Onset    Hypertension Mother     Emphysema Father     Prostate cancer Maternal Grandfather        Review of Systems:  A 10 point review of systems was performed and was normal, except as mentioned in the HPI, including constitutional, HEENT, heme, lymph, cardiovascular, respiratory, gastrointestinal, genitourinary, neurologic, endocrine, psychiatric and  musculoskeletal.      OBJECTIVE:    Physical Exam:  24 Hour Vital Sign Ranges: Temp:  [98.9 °F (37.2 °C)] 98.9 °F (37.2 °C)  Pulse:  [66] 66  Resp:  [18] 18  SpO2:  [95 %] 95 %  BP: (122)/(74) 122/74  Most recent vitals: /74 (BP Location: Left arm, Patient Position: Lying)   Pulse 66   Temp 98.9 °F (37.2 °C) (Oral)   Resp 18   SpO2 95%    GEN: well-developed, well-nourished, awake and alert, non-toxic appearing adult  HEENT: PERRL, sclera anicteric, oral mucosa pink and moist without lesion  NECK: trachea midline; Good ROM  CV: regular rate and rhythm, no murmurs or gallops  RESP: clear to auscultation bilaterally, no wheezes, rhonci or rales  ABD: soft, non-tender, non-distended, normal bowel sounds; PEG  EXT: no swelling or edema, 2+ pulses distally  SKIN: no rashes or jaundice  PSYCH: normal affect    Labs:   No results for input(s): WBC, MCV, PLT in the last 72 hours.    Invalid input(s): HGBAU  No results for input(s): NA, K, CL, CO2, BUN, GLU in the last 72 hours.    Invalid input(s): CREA  No results for input(s): ALB in the last 72 hours.    Invalid input(s): ALKP, SGOT, SGPT, TBIL, DBIL, TPRO  No results for input(s): PT, INR, PTT in the last 72 hours.      IMPRESSION / RECOMMENDATIONS:  EGD w/ stent removal. Risks, benefits, alternative discussed in detail with patient / family regarding anticipated procedure and possible complications.     Bobo [employer]  10/12/2020  10:04 AM

## 2020-10-12 NOTE — TRANSFER OF CARE
Anesthesia Transfer of Care Note    Patient: Sterling Toro    Procedure(s) Performed: Procedure(s) (LRB):  EGD (ESOPHAGOGASTRODUODENOSCOPY) WITH FLUORO (N/A)    Patient location: GI    Anesthesia Type: MAC    Transport from OR: Transported from OR on room air with adequate spontaneous ventilation    Post pain: adequate analgesia    Post assessment: no apparent anesthetic complications    Post vital signs: stable    Level of consciousness: awake and alert    Nausea/Vomiting: no nausea/vomiting    Complications: none    Transfer of care protocol was followed      Last vitals:   Visit Vitals  /63 (BP Location: Right arm, Patient Position: Lying)   Pulse 86   Temp 36 °C (96.8 °F) (Axillary)   Resp 14   SpO2 100%

## 2020-10-12 NOTE — [employer] PATIENT INSTRUCTIONS
Discharge Summary/Instructions after an Endoscopic Procedure  Patient Name: Sterling Toro  Patient MRN: [id number]  Patient YOB: 1960 Monday, October 12, 2020  Bobo Carpenter III, MD  RESTRICTIONS:  During your procedure today, you received medications for sedation.  These   medications may affect your judgment, balance and coordination.  Therefore,   for 24 hours, you have the following restrictions:   - DO NOT drive a car, operate machinery, make legal/financial decisions,   sign important papers or drink alcohol.    ACTIVITY:  Today: no heavy lifting, straining or running due to procedural   sedation/anesthesia.  The following day: return to full activity including work.  DIET:  Eat and drink normally unless instructed otherwise.     TREATMENT FOR COMMON SIDE EFFECTS:  - Mild abdominal pain, nausea, belching, bloating or excessive gas:  rest,   eat lightly and use a heating pad.  - Sore Throat: treat with throat lozenges and/or gargle with warm salt   water.  - Because air was used during the procedure, expelling large amounts of air   from your rectum or belching is normal.  - If a bowel prep was taken, you may not have a bowel movement for 1-3 days.    This is normal.  SYMPTOMS TO WATCH FOR AND REPORT TO YOUR PHYSICIAN:  1. Abdominal pain or bloating, other than gas cramps.  2. Chest pain.  3. Back pain.  4. Signs of infection such as: chills or fever occurring within 24 hours   after the procedure.  5. Rectal bleeding, which would show as bright red, maroon, or black stools.   (A tablespoon of blood from the rectum is not serious, especially if   hemorrhoids are present.)  6. Vomiting.  7. Weakness or dizziness.  GO DIRECTLY TO THE NEAREST EMERGENCY ROOM IF YOU HAVE ANY OF THE FOLLOWING:      Difficulty breathing              Chills and/or fever over 101 F   Persistent vomiting and/or vomiting blood   Severe abdominal pain   Severe chest pain   Black, tarry stools   Bleeding- more than one  tablespoon   Any other symptom or condition that you feel may need urgent attention  Your doctor recommends these additional instructions:  If any biopsies were taken, your doctors clinic will contact you in 1 to 2   weeks with any results.  - Patient has a contact number available for emergencies.  The signs and   symptoms of potential delayed complications were discussed with the   patient.  Return to normal activities tomorrow.  Written discharge   instructions were provided to the patient.   - Discharge patient to home (with escort).   - PPI daily indefinitely; If patient is able to bill PO, then will consider   removing PEG tube.  For questions, problems or results please call your physician - Bobo Carpenter III, MD at Work:  [phone].  [facility], EMERGENCY ROOM PHONE NUMBER: [phone]  IF A COMPLICATION OR EMERGENCY SITUATION ARISES AND YOU ARE UNABLE TO REACH   YOUR PHYSICIAN - GO DIRECTLY TO THE EMERGENCY ROOM.  Bobo Carpenter III, MD  10/12/2020 11:46:22 AM  This report has been verified and signed electronically.  [employer]

## 2021-12-10 ENCOUNTER — TELEPHONE (OUTPATIENT)
Dept: HEMATOLOGY/ONCOLOGY | Facility: CLINIC | Age: 61
End: 2021-12-10
Payer: MEDICAID